# Patient Record
Sex: FEMALE | Race: WHITE
[De-identification: names, ages, dates, MRNs, and addresses within clinical notes are randomized per-mention and may not be internally consistent; named-entity substitution may affect disease eponyms.]

---

## 2018-11-05 ENCOUNTER — HOSPITAL ENCOUNTER (OUTPATIENT)
Dept: HOSPITAL 19 - MC.RAD | Age: 23
End: 2018-11-05
Payer: COMMERCIAL

## 2018-11-05 DIAGNOSIS — N63.20: Primary | ICD-10-CM

## 2018-11-05 DIAGNOSIS — N60.19: ICD-10-CM

## 2020-10-22 ENCOUNTER — HOSPITAL ENCOUNTER (OUTPATIENT)
Dept: HOSPITAL 19 - COL.RAD | Age: 25
End: 2020-10-22
Attending: OBSTETRICS & GYNECOLOGY
Payer: COMMERCIAL

## 2020-10-22 DIAGNOSIS — R10.11: ICD-10-CM

## 2020-10-22 DIAGNOSIS — O26.893: Primary | ICD-10-CM

## 2020-10-22 DIAGNOSIS — Z3A.32: ICD-10-CM

## 2020-12-09 ENCOUNTER — HOSPITAL ENCOUNTER (OUTPATIENT)
Dept: HOSPITAL 19 - LAC | Age: 25
End: 2020-12-09
Attending: OBSTETRICS & GYNECOLOGY
Payer: COMMERCIAL

## 2020-12-09 DIAGNOSIS — Z71.89: ICD-10-CM

## 2020-12-09 NOTE — NUR
Pt, Mary Kraus, presents for outpatient lactation consult with 4 week old
baby boy, Niko Kraus, for a breastfeeding evaluation as Niko is not
latching for breastfeeding despite reaching his original EDC.  He was born
about 4 weeks premature.
 
Niko was born on 11/13/2022 and weighed 6#8.4oz.  He had latching trouble in
the hospital that was presumed to be related to prematurity.
 
Mary has been pumping, collecting 4-5oz per session, and bottle feeding
Niko 3oz q 2.5 hours.  She does offer him the breast, with and without the
nipple shield.
 
Today Niko weighs 9#2.2oz (4144 gms).  Pt's nipple presents normal, and has
milk starting to leak with latch attempts.
 
LC assists, only able to keep Niko at the breast for a few issa of sucks at
a time, he is resistant and gets frustruated with trying to breast feed.  We
attempt with the nipple shield as well.
 
Weight gain from the work at the breast is 8gms.
 
LC does an oral exam and notes Niko has a high anterior palate, and the tip
of this tongue retracts when he extends it, as well as it curls down.  LC did
not evaluate under the tongue as Niko was pretty fussy by this time.
Palate and tongue evaluation demonstrated to pt.
 
Pt states Niko takes 20-30 min to bottle feed as well.  LC evaluates bottle
feeding, he does have trouble with milk transfer, keeping hold of the nipple
and creating good compression.  He eventually takes about 1.5 oz from the
bottle and seems exhausted at that time.
 
POC:  Continue pumping and bottle feeding, offer breast as desired.  Discuss
with Dr. Singh at his appt in two days re: tongue tie.
 
F/U: as scheduled with Dr. Singh.  With this LC as desires to evaluate
improvement of latch.  Questions invited and answered.

## 2021-04-04 ENCOUNTER — HOSPITAL ENCOUNTER (OUTPATIENT)
Dept: HOSPITAL 19 - COL.LAB | Age: 26
End: 2021-04-04
Attending: INTERNAL MEDICINE

## 2021-04-04 DIAGNOSIS — Z20.822: Primary | ICD-10-CM

## 2021-10-01 ENCOUNTER — HOSPITAL ENCOUNTER (OUTPATIENT)
Dept: HOSPITAL 6 - LAB | Age: 26
End: 2021-10-01
Attending: FAMILY MEDICINE

## 2021-10-01 DIAGNOSIS — E78.5: ICD-10-CM

## 2021-10-01 DIAGNOSIS — Z00.00: Primary | ICD-10-CM
